# Patient Record
Sex: FEMALE | Race: WHITE | URBAN - METROPOLITAN AREA
[De-identification: names, ages, dates, MRNs, and addresses within clinical notes are randomized per-mention and may not be internally consistent; named-entity substitution may affect disease eponyms.]

---

## 2018-11-26 ENCOUNTER — OFFICE VISIT (OUTPATIENT)
Dept: URBAN - METROPOLITAN AREA CLINIC 33 | Facility: CLINIC | Age: 73
End: 2018-11-26
Payer: COMMERCIAL

## 2018-11-26 DIAGNOSIS — H25.813 COMBINED FORMS OF AGE-RELATED CATARACT, BILATERAL: ICD-10-CM

## 2018-11-26 PROCEDURE — 99214 OFFICE O/P EST MOD 30 MIN: CPT | Performed by: OPHTHALMOLOGY

## 2018-11-26 PROCEDURE — 92133 CPTRZD OPH DX IMG PST SGM ON: CPT | Performed by: OPHTHALMOLOGY

## 2018-11-26 ASSESSMENT — VISUAL ACUITY
OD: 20/25
OS: 20/25

## 2018-11-26 ASSESSMENT — KERATOMETRY
OD: 43.00
OS: 42.75

## 2018-11-26 ASSESSMENT — INTRAOCULAR PRESSURE
OD: 20
OS: 20

## 2018-11-26 NOTE — IMPRESSION/PLAN
Impression: Diagnosis: Ocular hypertension, bilateral. Code: H40.053. 11/18 OCT 81/80 7/6, /603, 5/16 HVF Full OU, S/p SLT OS 11/29/16 Plan: IOP reasonable OU. Goal below 25 OS. Good response from SLT treatment, at goal pressure today. Will monitor IOP closely. 4 month IOP check with Dr Ebony Turcios.

## 2019-03-18 ENCOUNTER — OFFICE VISIT (OUTPATIENT)
Dept: URBAN - METROPOLITAN AREA CLINIC 33 | Facility: CLINIC | Age: 74
End: 2019-03-18
Payer: COMMERCIAL

## 2019-03-18 PROCEDURE — 92012 INTRM OPH EXAM EST PATIENT: CPT | Performed by: OPTOMETRIST

## 2019-03-18 RX ORDER — LEVOTHYROXINE SODIUM 25 UG/1
CAPSULE ORAL
Qty: 0 | Refills: 0 | Status: INACTIVE
Start: 2019-03-18 | End: 2019-03-18

## 2019-03-18 RX ORDER — ATORVASTATIN CALCIUM 20 MG/1
20 MG TABLET, FILM COATED ORAL
Qty: 0 | Refills: 0 | Status: ACTIVE
Start: 2019-03-18

## 2019-03-18 ASSESSMENT — INTRAOCULAR PRESSURE
OD: 20
OS: 21

## 2019-03-18 NOTE — IMPRESSION/PLAN
Impression: Diagnosis: Ocular hypertension, bilateral. Code: H40.053. 11/18 OCT 81/80 7/6, /603, 5/16 HVF Full OU, S/p SLT OS 11/29/16  IOP stable OU Plan: Goal below 25 OS. Good response from SLT treatment, at goal pressure today. Will monitor IOP closely. orig IOP 21/26, Eva IOP 20/20, OCT 81/88? (82/81? )(85/83)(77/81? )(75?/77?), VF OD borderline OS NL

## 2019-06-19 ENCOUNTER — APPOINTMENT (OUTPATIENT)
Dept: RADIOLOGY | Facility: CLINIC | Age: 74
End: 2019-06-19
Payer: COMMERCIAL

## 2019-06-19 ENCOUNTER — OFFICE VISIT (OUTPATIENT)
Dept: URGENT CARE | Facility: CLINIC | Age: 74
End: 2019-06-19
Payer: COMMERCIAL

## 2019-06-19 VITALS
HEIGHT: 62 IN | TEMPERATURE: 98.8 F | HEART RATE: 89 BPM | DIASTOLIC BLOOD PRESSURE: 91 MMHG | RESPIRATION RATE: 18 BRPM | SYSTOLIC BLOOD PRESSURE: 136 MMHG | BODY MASS INDEX: 28.71 KG/M2 | OXYGEN SATURATION: 99 % | WEIGHT: 156 LBS
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
## 2019-06-19 DIAGNOSIS — R07.81 RIB PAIN ON LEFT SIDE: ICD-10-CM
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
## 2019-06-19 DIAGNOSIS — R07.81 RIB PAIN ON LEFT SIDE: Primary | ICD-10-CM

## 2019-06-19 DIAGNOSIS — S22.42XA CLOSED FRACTURE OF MULTIPLE RIBS OF LEFT SIDE, INITIAL ENCOUNTER: ICD-10-CM

## 2019-06-19 PROCEDURE — 96372 THER/PROPH/DIAG INJ SC/IM: CPT | Performed by: FAMILY MEDICINE

## 2019-06-19 PROCEDURE — 99213 OFFICE O/P EST LOW 20 MIN: CPT | Performed by: FAMILY MEDICINE

## 2019-06-19 PROCEDURE — G0463 HOSPITAL OUTPT CLINIC VISIT: HCPCS | Performed by: FAMILY MEDICINE

## 2019-06-19 PROCEDURE — 71101 X-RAY EXAM UNILAT RIBS/CHEST: CPT

## 2019-06-19 RX ORDER — LEVOTHYROXINE SODIUM 88 UG/1
TABLET ORAL
COMMUNITY
Start: 2019-04-15

## 2019-06-19 RX ORDER — TRAMADOL HYDROCHLORIDE 50 MG/1
50 TABLET ORAL EVERY 8 HOURS PRN
Qty: 15 TABLET | Refills: 0 | Status: SHIPPED | OUTPATIENT
Start: 2019-06-19

## 2019-06-19 RX ORDER — MAG HYDROX/ALUMINUM HYD/SIMETH 400-400-40
SUSPENSION, ORAL (FINAL DOSE FORM) ORAL
COMMUNITY
Start: 2019-06-07

## 2019-06-19 RX ORDER — KETOROLAC TROMETHAMINE 30 MG/ML
30 INJECTION, SOLUTION INTRAMUSCULAR; INTRAVENOUS ONCE
Status: COMPLETED | OUTPATIENT
Start: 2019-06-19 | End: 2019-06-19

## 2019-06-19 RX ORDER — CALCIUM CARBONATE/VITAMIN D2 250 MG-125
TABLET ORAL
COMMUNITY
Start: 2019-06-07

## 2019-06-19 RX ORDER — ATORVASTATIN CALCIUM 20 MG/1
TABLET, FILM COATED ORAL
COMMUNITY
Start: 2019-04-15

## 2019-06-19 RX ADMIN — KETOROLAC TROMETHAMINE 30 MG: 30 INJECTION, SOLUTION INTRAMUSCULAR; INTRAVENOUS at 13:26

## 2019-11-11 ENCOUNTER — OFFICE VISIT (OUTPATIENT)
Dept: URBAN - METROPOLITAN AREA CLINIC 33 | Facility: CLINIC | Age: 74
End: 2019-11-11
Payer: COMMERCIAL

## 2019-11-11 PROCEDURE — 99214 OFFICE O/P EST MOD 30 MIN: CPT | Performed by: OPTOMETRIST

## 2019-11-11 PROCEDURE — 92133 CPTRZD OPH DX IMG PST SGM ON: CPT | Performed by: OPTOMETRIST

## 2019-11-11 PROCEDURE — 92083 EXTENDED VISUAL FIELD XM: CPT | Performed by: OPTOMETRIST

## 2019-11-11 ASSESSMENT — INTRAOCULAR PRESSURE
OD: 20
OS: 20
OS: 22

## 2019-11-11 NOTE — IMPRESSION/PLAN
Impression: Diagnosis: Ocular hypertension, bilateral. Code: H40.053. 11/18 OCT 81/80 7/6, /603, 5/16 HVF Full OU, S/p SLT OS 11/29/16  IOP stable OU Plan: Goal below 25 OS. Good response from SLT treatment, at goal pressure today. Will monitor IOP closely. orig IOP 21/26, Eva IOP 20/20, OCT 76/78? (81/88? )(82/81? )(85/83)(77/81? )(75?/77?), VF OD a few misses OS NL BJW

## 2020-03-02 ENCOUNTER — OFFICE VISIT (OUTPATIENT)
Dept: URBAN - METROPOLITAN AREA CLINIC 33 | Facility: CLINIC | Age: 75
End: 2020-03-02
Payer: COMMERCIAL

## 2020-03-02 PROCEDURE — 99213 OFFICE O/P EST LOW 20 MIN: CPT | Performed by: OPTOMETRIST

## 2020-03-02 ASSESSMENT — INTRAOCULAR PRESSURE
OS: 22
OD: 21

## 2020-03-02 NOTE — IMPRESSION/PLAN
Impression: Diagnosis: Ocular hypertension, bilateral. Code: H40.053. 11/18 OCT 81/80 7/6, /603, 5/16 HVF Full OU, S/p SLT OS 11/29/16 stable IOP OU Plan: Goal below 25 OS. Good response from SLT treatment, at goal pressure today. Will monitor IOP closely. orig IOP 21/26, Eva IOP 20/20, OCT 76/78? (81/88? )(82/81? )(85/83)(77/81? )(75?/77?), VF OD a few misses OS NL BJW

## 2020-12-08 ENCOUNTER — OFFICE VISIT (OUTPATIENT)
Dept: URBAN - METROPOLITAN AREA CLINIC 33 | Facility: CLINIC | Age: 75
End: 2020-12-08
Payer: COMMERCIAL

## 2020-12-08 DIAGNOSIS — H40.053 OCULAR HYPERTENSION, BILATERAL: Primary | ICD-10-CM

## 2020-12-08 PROCEDURE — 92012 INTRM OPH EXAM EST PATIENT: CPT | Performed by: OPTOMETRIST

## 2020-12-08 ASSESSMENT — INTRAOCULAR PRESSURE
OD: 23
OS: 23

## 2021-04-22 ENCOUNTER — OFFICE VISIT (OUTPATIENT)
Dept: URBAN - METROPOLITAN AREA CLINIC 33 | Facility: CLINIC | Age: 76
End: 2021-04-22
Payer: COMMERCIAL

## 2021-04-22 PROCEDURE — 92133 CPTRZD OPH DX IMG PST SGM ON: CPT | Performed by: OPTOMETRIST

## 2021-04-22 PROCEDURE — 92014 COMPRE OPH EXAM EST PT 1/>: CPT | Performed by: OPTOMETRIST

## 2021-04-22 PROCEDURE — 92082 INTERMEDIATE VISUAL FIELD XM: CPT | Performed by: OPTOMETRIST

## 2021-04-22 ASSESSMENT — INTRAOCULAR PRESSURE
OD: 21
OS: 21

## 2021-04-22 NOTE — IMPRESSION/PLAN
Impression: Diagnosis: Ocular hypertension, bilateral. Code: H40.053. 11/18 OCT 81/80 7/6, /603, 5/16 HVF Full OU, S/p SLT OS 11/29/16
stable IOP OU Plan: Goal below 25 OS. Good response from SLT treatment, at goal pressure today. Will monitor IOP closely. orig IOP 21/26, Eva IOP 20/20, OCT 75?/75?(76/78? )(81/88? )(82/81? )(85/83)(77/81? )(75?/77?), VF OD inf misses OS scattered misses BJW

## 2021-11-04 ENCOUNTER — OFFICE VISIT (OUTPATIENT)
Dept: URBAN - METROPOLITAN AREA CLINIC 33 | Facility: CLINIC | Age: 76
End: 2021-11-04
Payer: COMMERCIAL

## 2021-11-04 DIAGNOSIS — H05.243 CONSTANT EXOPHTHALMOS, BILATERAL: ICD-10-CM

## 2021-11-04 PROCEDURE — 99213 OFFICE O/P EST LOW 20 MIN: CPT | Performed by: OPTOMETRIST

## 2021-11-04 ASSESSMENT — INTRAOCULAR PRESSURE
OS: 21
OD: 21

## 2021-11-04 NOTE — IMPRESSION/PLAN
Impression: Diagnosis: Ocular hypertension, bilateral. Code: H40.053. S/P SLT OS 11/29/16
orig IOP 21/26 Plan: IOPs 21/21 without medication, s/p SLT. Goal below 25 OU per Dr. Danita Livingston O.D.. Intraocular pressures WNL and within goal IOP. Continue without medication at this time.

## 2021-11-04 NOTE — IMPRESSION/PLAN
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 Plan: Patient has history of Grave's Ophthalmopathy and is waiting to begin treatment with Tepezza. Discussed lagophthalmos OS and associated redness and dry eye symptoms. Recommend increased artificial tears and night time gel or ointment.